# Patient Record
Sex: MALE | Race: WHITE | NOT HISPANIC OR LATINO | Employment: OTHER | URBAN - METROPOLITAN AREA
[De-identification: names, ages, dates, MRNs, and addresses within clinical notes are randomized per-mention and may not be internally consistent; named-entity substitution may affect disease eponyms.]

---

## 2023-03-02 ENCOUNTER — TELEPHONE (OUTPATIENT)
Dept: RADIATION ONCOLOGY | Facility: HOSPITAL | Age: 73
End: 2023-03-02

## 2023-03-02 PROBLEM — C34.92 MALIGNANT NEOPLASM OF UNSPECIFIED PART OF LEFT BRONCHUS OR LUNG: Status: ACTIVE | Noted: 2023-03-02

## 2023-03-02 PROBLEM — C77.1 SECONDARY AND UNSPECIFIED MALIGNANT NEOPLASM OF INTRATHORACIC LYMPH NODES: Status: ACTIVE | Noted: 2023-03-02

## 2023-03-02 PROBLEM — I10 HYPERTENSION: Status: ACTIVE | Noted: 2023-03-02

## 2023-03-02 PROBLEM — R91.1 SOLITARY PULMONARY NODULE: Status: ACTIVE | Noted: 2023-03-02

## 2023-03-02 NOTE — TELEPHONE ENCOUNTER
Called and spoke to pt regarding [facility]. Scheduled consult for 03.06.23 @ 1 with Dr. Hema Wright. Gave pt directions and will mail a copy of appt reminder to pt address. Pt agreed and understood.

## 2023-03-10 ENCOUNTER — CONSULT (OUTPATIENT)
Dept: RADIATION ONCOLOGY | Facility: HOSPITAL | Age: 73
End: 2023-03-10
Payer: OTHER GOVERNMENT

## 2023-03-10 VITALS
DIASTOLIC BLOOD PRESSURE: 63 MMHG | SYSTOLIC BLOOD PRESSURE: 134 MMHG | TEMPERATURE: 98 F | WEIGHT: 241.18 LBS | RESPIRATION RATE: 20 BRPM | HEART RATE: 61 BPM | OXYGEN SATURATION: 98 %

## 2023-03-10 DIAGNOSIS — C34.12 PRIMARY SQUAMOUS CELL CARCINOMA OF UPPER LOBE OF LEFT LUNG: Primary | ICD-10-CM

## 2023-03-10 DIAGNOSIS — C34.92 MALIGNANT NEOPLASM OF UNSPECIFIED PART OF LEFT BRONCHUS OR LUNG: ICD-10-CM

## 2023-03-10 PROCEDURE — G0463 HOSPITAL OUTPT CLINIC VISIT: HCPCS | Performed by: RADIOLOGY

## 2023-03-10 PROCEDURE — 99204 OFFICE O/P NEW MOD 45 MIN: CPT | Performed by: RADIOLOGY

## 2023-03-10 RX ORDER — LISINOPRIL 10 MG/1
10 TABLET ORAL DAILY
COMMUNITY

## 2023-03-10 RX ORDER — MULTIPLE VITAMINS W/ MINERALS TAB 9MG-400MCG
1 TAB ORAL DAILY
COMMUNITY

## 2023-03-10 RX ORDER — GABAPENTIN 300 MG/1
300 CAPSULE ORAL 3 TIMES DAILY
COMMUNITY

## 2023-03-10 RX ORDER — HYDROCODONE BITARTRATE AND ACETAMINOPHEN 7.5; 325 MG/1; MG/1
1 TABLET ORAL EVERY 6 HOURS PRN
COMMUNITY

## 2023-03-10 RX ORDER — METOPROLOL SUCCINATE 100 MG/1
200 TABLET, EXTENDED RELEASE ORAL DAILY
COMMUNITY

## 2023-03-10 RX ORDER — DILTIAZEM HYDROCHLORIDE 120 MG/1
120 TABLET, FILM COATED ORAL ONCE
COMMUNITY

## 2023-03-10 RX ORDER — TRIAMTERENE AND HYDROCHLOROTHIAZIDE 75; 50 MG/1; MG/1
1 TABLET ORAL DAILY
COMMUNITY

## 2023-03-10 RX ORDER — LIDOCAINE 50 MG/G
1 PATCH TOPICAL EVERY 24 HOURS
COMMUNITY

## 2023-03-10 RX ORDER — TAMSULOSIN HYDROCHLORIDE 0.4 MG/1
1 CAPSULE ORAL DAILY
COMMUNITY

## 2023-03-10 RX ORDER — ATORVASTATIN CALCIUM 40 MG/1
40 TABLET, FILM COATED ORAL
COMMUNITY

## 2023-03-10 RX ORDER — PREDNISONE 1 MG/1
5 TABLET ORAL DAILY
COMMUNITY

## 2023-03-10 RX ORDER — MELOXICAM 15 MG/1
15 TABLET ORAL DAILY
COMMUNITY

## 2023-03-10 RX ORDER — ACETAMINOPHEN 325 MG/1
650 TABLET ORAL EVERY 6 HOURS PRN
COMMUNITY

## 2023-03-10 NOTE — PROGRESS NOTES
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  Mr. Alexander had a left upper lobe tumor that increased from 0.6 to 2.5 cm on outside CT scan.  He was evaluated by thoracic surgeon and underwent robotic left upper lobe lobectomy and lymph node dissection on 8/22/2022.  Pathology revealed keratinizing squamous cell carcinoma measuring 3.5 cm with 1 of 2 hilar lymph nodes positive for metastatic squamous cell carcinoma.  The tumor focally involves the visceral pleural surface and all margins were negative for carcinoma.  Mr. Alexander was evaluated by medical oncology and advised to undergo adjuvant chemotherapy with carboplatin/paclitaxel/atezolizumab.  He completed this in December 2022 but postchemotherapy PET scan revealed a suspected metastases between the ninth and 10th ribs.  He continues on immunotherapy and reports only arthralgias.  PET/CT on 2/17/2023 revealed a 1 x 1.5 cm left extrapleural nodule with intense uptake (SUV 7) abutting the pleural effusion and in the 9th/10th intercostal space.  There were multiple borderline or enlarged lymph nodes with moderate uptake in the mediastinum bilaterally, unchanged and most likely representing an inflammatory process or sarcoidosis.  There was slight prominence with asymmetric uptake in the left Linette tonsil of unknown certain  significance.      Subjective          Review of Systems: Review of Systems   Constitutional: Positive for appetite change (Decreased), fatigue (7/10) and unexpected weight change (States he thinks he has lost approximately 20lbs).   HENT: Negative for hearing loss, sore throat, tinnitus, trouble swallowing and voice change.    Eyes: Negative for visual disturbance.   Respiratory: Positive for shortness of breath (Noted with activity, ongoing). Negative for cough and wheezing.    Cardiovascular: Negative for chest pain, palpitations and leg swelling.   Gastrointestinal: Positive for constipation (Takes otc stool softener with some relief.). Negative for blood in stool, diarrhea, nausea and vomiting.   Genitourinary: Positive for urgency (Ongoing). Negative for difficulty urinating, dysuria, frequency and hematuria.   Musculoskeletal: Positive for arthralgias (Left shoulder, ongoing), back pain (Ongoing), gait problem (Walks with a walker   or cane.) and joint swelling (Feet, noted after starting chemo).   Skin: Negative for color change and rash.   Neurological: Positive for weakness (Knees, newer since starting new chemo.). Negative for dizziness and headaches.   Psychiatric/Behavioral: Positive for sleep disturbance (Wakes throughout the night related to pain, ongoing.).       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)    • Solitary pulmonary nodule        Past Surgical History:   Past Surgical History:   Procedure Laterality Date   • COLONOSCOPY     • LUNG SURGERY     • POLYPECTOMY         Family History:   Family History   Problem  Relation Age of Onset   • Cancer Mother    • COPD Father    • Emphysema Father        Social History:   Social History     Socioeconomic History   • Marital status:    Tobacco Use   • Smoking status: Former     Packs/day: 2.00     Years: 40.00     Pack years: 80.00     Types: Cigarettes     Quit date: 2008     Years since quitting: 15.1   Substance and Sexual Activity   • Alcohol use: Not Currently     Alcohol/week: 5.0 standard drinks     Types: 5 Cans of beer per week     Comment: 5 beers a day   • Drug use: Not Currently     Types: Marijuana     Comment: OCCASIONALLY SMOKES THC       Medications:     Current Outpatient Medications:   •  acetaminophen (TYLENOL) 325 MG tablet, Take 2 tablets by mouth Every 6 (Six) Hours As Needed for Mild Pain., Disp: , Rfl:   •  apixaban (ELIQUIS) 5 MG tablet tablet, Take 1 tablet by mouth 2 (Two) Times a Day., Disp: , Rfl:   •  atorvastatin (LIPITOR) 40 MG tablet, Take 1 tablet by mouth., Disp: , Rfl:   •  dilTIAZem (CARDIZEM) 120 MG tablet, Take 1 tablet by mouth 1 (One) Time., Disp: , Rfl:   •  gabapentin (NEURONTIN) 300 MG capsule, Take 1 capsule by mouth 3 (Three) Times a Day., Disp: , Rfl:   •  HYDROcodone-acetaminophen (NORCO) 7.5-325 MG per tablet, Take 1 tablet by mouth Every 6 (Six) Hours As Needed for Moderate Pain., Disp: , Rfl:   •  lidocaine (LIDODERM) 5 %, Place 1 patch on the skin as directed by provider Daily. Remove & Discard patch within 12 hours or as directed by MD, Disp: , Rfl:   •  lisinopril (PRINIVIL,ZESTRIL) 10 MG tablet, Take 1 tablet by mouth Daily., Disp: , Rfl:   •  meloxicam (MOBIC) 15 MG tablet, Take 1 tablet by mouth Daily., Disp: , Rfl:   •  metFORMIN (GLUCOPHAGE) 500 MG tablet, Take 1 tablet by mouth., Disp: , Rfl:   •  metoprolol succinate XL (TOPROL-XL) 100 MG 24 hr tablet, Take 2 tablets by mouth Daily. Take one tablet by mouth daily, Disp: , Rfl:   •  multivitamin with minerals tablet tablet, Take 1 tablet by mouth Daily., Disp: , Rfl:    •  predniSONE (DELTASONE) 5 MG tablet, Take 1 tablet by mouth Daily., Disp: , Rfl:   •  tamsulosin (FLOMAX) 0.4 MG capsule 24 hr capsule, Take 1 capsule by mouth Daily., Disp: , Rfl:   •  triamterene-hydrochlorothiazide (MAXZIDE) 75-50 MG per tablet, Take 1 tablet by mouth Daily., Disp: , Rfl:     Allergies:   Allergies   Allergen Reactions   • Aspirin Unknown - Low Severity     ALLERGIC TO ASPIRIN RELATED MEDICATIONS       Pain: (on a scale of 0-10)   Pain Score    03/10/23 1255   PainSc:   8       Advanced Care Plan: N   Quality of Life: 80 - Restricted Physical Activity    Objective     Physical Exam:   Vital Signs:   Vitals:    03/10/23 1255   BP: 134/63   Pulse: 61   Resp: 20   Temp: 98 °F (36.7 °C)   TempSrc: Temporal   SpO2: 98%   Weight: 109 kg (241 lb 2.9 oz)   PainSc:   8     There is no height or weight on file to calculate BMI.     Physical Exam  Constitutional:       General: He is not in acute distress.     Appearance: Normal appearance. He is not ill-appearing, toxic-appearing or diaphoretic.   HENT:      Head: Normocephalic and atraumatic.   Pulmonary:      Effort: Pulmonary effort is normal. No respiratory distress.   Chest:      Comments: Three left lateral chest wall incisions well-healed with no suspicious skin changes or underlying palpable masses.  Abdominal:      Palpations: Abdomen is soft.   Skin:     General: Skin is warm and dry.      Coloration: Skin is not jaundiced.   Neurological:      General: No focal deficit present.      Mental Status: He is alert and oriented to person, place, and time. Mental status is at baseline.      Cranial Nerves: No cranial nerve deficit.   Psychiatric:         Mood and Affect: Mood normal.         Behavior: Behavior normal.         Judgment: Judgment normal.         Results:   Radiographs: I personally reviewed the radiology report from the PET/CT performed on 2/17/2023.  The pertinent findings are as above.    Pathology: I personally reviewed the  pathology report from the procedure performed on 8/22/2022.  The pertinent findings are as above.      Assessment / Plan        Assessment/Plan:   Rafiq Alexander is a 72-year-old gentleman with initial cT2 cN0 cM0 squamous cell carcinoma of the left upper lobe.  He is status post left upper lobectomy; pT2 pN0.  He commenced adjuvant chemotherapy with carboplatin/paclitaxel/atezolizumab.  He is receiving maintenance immunotherapy but has an FDG avid left chest wall lesion between the 9th and 10th ribs concerning for metastatic disease.  ECOG 2    I discussed the clinical, radiographic and pathologic findings today with Mr. Alexander.  I explained the role of radiotherapy in the management of oligometastatic disease from lung cancer.  I explained that it appears as though the trocar site from his surgery has been seated by carcinoma and this is his only site of disease.  I did explain that the radiographic findings may not represent carcinoma, thus I recommended biopsy of the suspected lesion.  I explained my rationale for this and that SBRT to the chest wall with adjacent ribs is not an innocuous treatment.  I explained the potential chronic toxicity of chest wall pain, rib fracture and tissue necrosis with SBRT of the chest wall.  Mr. Alexander is agreeable to biopsy of this lesion and will undergo this procedure at [facility] with follow-up thereafter regarding management strategies.        Hema Wright MD  Radiation Oncology  [facility]    This document has been signed by Hema Wright MD on March 10, 2023 14:13 EST

## 2023-03-13 ENCOUNTER — TELEPHONE (OUTPATIENT)
Dept: NUTRITION | Facility: HOSPITAL | Age: 73
End: 2023-03-13
Payer: OTHER GOVERNMENT

## 2023-03-13 NOTE — PROGRESS NOTES
"Nutrition referral received 3/10/23 due to unintentional wt loss.  Pt had reported 20# wt decline recently, during his radiation oncology consult.  Pt reported during the consult that is experiencing decreased appetite, along with constipation.  Spoke to pt briefly on the phone today 3/13/23.  He stated he \"[facility]\" at [facility].  Briefly explained the role of the Oncology RD's at [facility].  Explained we will be available to provide further nutrition counseling or intervention as needed.  Pt v/u.    Oncology RD remains available per protocol.  "

## 2023-03-21 ENCOUNTER — TELEPHONE (OUTPATIENT)
Dept: RADIATION ONCOLOGY | Facility: HOSPITAL | Age: 73
End: 2023-03-21
Payer: OTHER GOVERNMENT

## 2023-03-21 NOTE — TELEPHONE ENCOUNTER
Distress Screening Follow-Up    Date of Distress Screening: 3/10/23    Location of Distress Screening: Rad onc    Distress Level: 7    Problems Indicated: Pain, sleep, fatigue, memory or concentration, changes in eating, loss or change of physical abilities, worry or anxiety, loss of interest or enjoyment, relationship with spouse, taking care of myself, transportation     Diagnosis: Lung cancer    Current Tx Plan: Mr. Alexander underwent lobectomy and lymph node dissection on 8/22/22 and completed adjuvant chemotherapy in December 2022. He is now receiving maintenance immunotherapy at the [facility] , but has a left chest wall lesion between the 9th and 10th ribs concerning for metastatic disease. Radiation oncologist has recommended biopsy of the suspected lesion.     Most Recent PHQ -2/PHQ-9 Score: 13 - denied SI (3/10/23)    C-SSRS: Denied SI and screened negative (3/10/23)    Mood: Mr. Alexander was very pleasant, easily engaged in conversation via telephone and was forward thinking today. Inquired about mental health history and if he's ever been seen by a behavioral health provider through the [facility]. Pt believes he previously was seen by someone after receiving his diagnosis, but is not actively receiving behavioral health services. Educated pt on the support services that could be accessed to address emotional needs. He declined any referrals at this time. No SI/HI reported or indicated today.    Support System: Mr. Alexander reports his primary support is his spouse, however, she is undergoing breast cancer tx herself in [place] and is currently undergoing surgery today.    Residential status/Who lives in the home: Pt resides in home with his spouse in [facility] Care Needs: Mr. Alexander is fairly independent with his ADLs, however, has a cane and uses a walker when outside of the home. He is currently [facility] on the [facility] to provide him with a shower chair.    Insurance: [facility] an [employer]  Medicare    Finances: Pt identifies no financial concerns with medical expenses and/or meeting basic needs at this time. Pt is a .     Transportation: Mr. Alexander drives himself [facility] [place]. 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 Inquired if pt's looked into seeing if he's eligible for travel reimbursement through the [facility]. Mr. Alexander reports he had previously before and it didn't do him any good. He believes maybe he made too much money to be eligible. However, pt reports he feels he may need transportation assistance before long. Encouraged he discuss this with [facility]  or  to see what resources may be available to him.    Advance Care Planning: No directive on file    Resources/Referrals: Care coordination, emotional support, transportation resources    Additional Comment: OSW contacted pt via telephone to f/u in regards to identified distress, introduce OSW role and assess for psychosocial needs. Educated patient on the support services that could be accessed to address physical, emotional, social, practical and spiritual needs. Pt has been referred to our oncology RD to address weight loss and nutritional needs, however, pt informed her he is already being seen by an RD through the [facility]. Inquired about pt's treatment plan and lesion biopsy. Pt reports he is agreeable to the biopsy, but hasn't heard anything further regarding this since his consult. Informed pt OSW will consult with radiation oncology to further facilitate. OSW support remains available. Pt expressed gratitude.     Update 3/27/23: Radiation oncology staff reports the [facility] is supposed to be ordering the biopsy and reaching out to the patient to facilitate this at the [facility]. Melissa STERLING MA with radiation oncology will reach out to Mr. Alexander to make him aware. OSW support remains available.

## 2023-03-27 ENCOUNTER — DOCUMENTATION (OUTPATIENT)
Dept: RADIATION ONCOLOGY | Facility: HOSPITAL | Age: 73
End: 2023-03-27
Payer: OTHER GOVERNMENT

## 2023-03-27 NOTE — PROGRESS NOTES
"Pt spoke with Olamide Gee our  and was concerned that someone had \"dropped the ball\".  Pt hasn't received any information on the next step after his consult.  I spoke with Dr. Wright and he was advised by [facility] that they will be ordering and scheduling the biopsy due to it having to be done by [facility].  I called pt to advise him of this and at this time the [facility] has not contacted him.  I told the patient if he has not heard anything by the end of the week to let me know and we would find out what the plan is.  Pt voiced understanding and will call.  "

## 2023-04-04 ENCOUNTER — TRANSCRIBE ORDERS (OUTPATIENT)
Dept: ADMINISTRATIVE | Facility: HOSPITAL | Age: 73
End: 2023-04-04
Payer: OTHER GOVERNMENT

## 2023-04-04 DIAGNOSIS — C34.32 MALIGNANT NEOPLASM OF LOWER LOBE, LEFT BRONCHUS OR LUNG: Primary | ICD-10-CM

## 2023-04-17 ENCOUNTER — HOSPITAL ENCOUNTER (OUTPATIENT)
Dept: GENERAL RADIOLOGY | Facility: HOSPITAL | Age: 73
Discharge: HOME | End: 2023-04-17
Payer: OTHER GOVERNMENT

## 2023-04-17 ENCOUNTER — HOSPITAL ENCOUNTER (OUTPATIENT)
Dept: CT IMAGING | Facility: HOSPITAL | Age: 73
Discharge: HOME | End: 2023-04-17
Payer: OTHER GOVERNMENT

## 2023-04-17 VITALS
WEIGHT: 230 LBS | DIASTOLIC BLOOD PRESSURE: 50 MMHG | HEART RATE: 63 BPM | HEIGHT: 70 IN | RESPIRATION RATE: 16 BRPM | BODY MASS INDEX: 32.93 KG/M2 | SYSTOLIC BLOOD PRESSURE: 113 MMHG | OXYGEN SATURATION: 98 %

## 2023-04-17 DIAGNOSIS — C34.32 MALIGNANT NEOPLASM OF LOWER LOBE, LEFT BRONCHUS OR LUNG: ICD-10-CM

## 2023-04-17 LAB
APTT PPP: 32.2 SECONDS (ref 24.2–34.2)
BASOPHILS # BLD AUTO: 0.03 10*3/MM3 (ref 0–0.2)
BASOPHILS NFR BLD AUTO: 0.4 % (ref 0–1.5)
DEPRECATED RDW RBC AUTO: 42.1 FL (ref 37–54)
EOSINOPHIL # BLD AUTO: 0.12 10*3/MM3 (ref 0–0.4)
EOSINOPHIL NFR BLD AUTO: 1.7 % (ref 0.3–6.2)
ERYTHROCYTE [DISTWIDTH] IN BLOOD BY AUTOMATED COUNT: 13.5 % (ref 12.3–15.4)
HCT VFR BLD AUTO: 37.4 % (ref 37.5–51)
HGB BLD-MCNC: 12.8 G/DL (ref 13–17.7)
IMM GRANULOCYTES # BLD AUTO: 0.02 10*3/MM3 (ref 0–0.05)
IMM GRANULOCYTES NFR BLD AUTO: 0.3 % (ref 0–0.5)
INR PPP: 1.07 (ref 0.86–1.15)
LYMPHOCYTES # BLD AUTO: 1.24 10*3/MM3 (ref 0.7–3.1)
LYMPHOCYTES NFR BLD AUTO: 18 % (ref 19.6–45.3)
MCH RBC QN AUTO: 29.3 PG (ref 26.6–33)
MCHC RBC AUTO-ENTMCNC: 34.2 G/DL (ref 31.5–35.7)
MCV RBC AUTO: 85.6 FL (ref 79–97)
MONOCYTES # BLD AUTO: 0.61 10*3/MM3 (ref 0.1–0.9)
MONOCYTES NFR BLD AUTO: 8.9 % (ref 5–12)
NEUTROPHILS NFR BLD AUTO: 4.86 10*3/MM3 (ref 1.7–7)
NEUTROPHILS NFR BLD AUTO: 70.7 % (ref 42.7–76)
NRBC BLD AUTO-RTO: 0 /100 WBC (ref 0–0.2)
PLATELET # BLD AUTO: 249 10*3/MM3 (ref 140–450)
PMV BLD AUTO: 9 FL (ref 6–12)
PROTHROMBIN TIME: 14 SECONDS (ref 11.8–14.9)
RBC # BLD AUTO: 4.37 10*6/MM3 (ref 4.14–5.8)
WBC NRBC COR # BLD: 6.88 10*3/MM3 (ref 3.4–10.8)

## 2023-04-17 PROCEDURE — 85730 THROMBOPLASTIN TIME PARTIAL: CPT | Performed by: RADIOLOGY

## 2023-04-17 PROCEDURE — 88305 TISSUE EXAM BY PATHOLOGIST: CPT | Performed by: RADIOLOGY

## 2023-04-17 PROCEDURE — 25010000002 FENTANYL CITRATE (PF) 50 MCG/ML SOLUTION: Performed by: RADIOLOGY

## 2023-04-17 PROCEDURE — 85025 COMPLETE CBC W/AUTO DIFF WBC: CPT | Performed by: RADIOLOGY

## 2023-04-17 PROCEDURE — 25010000002 ONDANSETRON PER 1 MG: Performed by: RADIOLOGY

## 2023-04-17 PROCEDURE — 71045 X-RAY EXAM CHEST 1 VIEW: CPT

## 2023-04-17 PROCEDURE — 85610 PROTHROMBIN TIME: CPT | Performed by: RADIOLOGY

## 2023-04-17 RX ORDER — LIDOCAINE HYDROCHLORIDE 20 MG/ML
INJECTION, SOLUTION INFILTRATION; PERINEURAL
Status: DISPENSED
Start: 2023-04-17 | End: 2023-04-17

## 2023-04-17 RX ORDER — FENTANYL CITRATE 50 UG/ML
INJECTION, SOLUTION INTRAMUSCULAR; INTRAVENOUS AS NEEDED
Status: COMPLETED | OUTPATIENT
Start: 2023-04-17 | End: 2023-04-17

## 2023-04-17 RX ORDER — ONDANSETRON 2 MG/ML
INJECTION INTRAMUSCULAR; INTRAVENOUS AS NEEDED
Status: COMPLETED | OUTPATIENT
Start: 2023-04-17 | End: 2023-04-17

## 2023-04-17 RX ADMIN — FENTANYL CITRATE 50 MCG: 50 INJECTION, SOLUTION INTRAMUSCULAR; INTRAVENOUS at 09:29

## 2023-04-17 RX ADMIN — ONDANSETRON 4 MG: 2 INJECTION INTRAMUSCULAR; INTRAVENOUS at 09:28

## 2023-04-17 RX ADMIN — FENTANYL CITRATE 25 MCG: 50 INJECTION, SOLUTION INTRAMUSCULAR; INTRAVENOUS at 09:45

## 2023-04-18 LAB
CYTO UR: NORMAL
LAB AP CASE REPORT: NORMAL
LAB AP CLINICAL INFORMATION: NORMAL
PATH REPORT.FINAL DX SPEC: NORMAL
PATH REPORT.GROSS SPEC: NORMAL